# Patient Record
Sex: FEMALE | Race: WHITE | NOT HISPANIC OR LATINO | Employment: FULL TIME | URBAN - METROPOLITAN AREA
[De-identification: names, ages, dates, MRNs, and addresses within clinical notes are randomized per-mention and may not be internally consistent; named-entity substitution may affect disease eponyms.]

---

## 2023-12-01 ENCOUNTER — OFFICE VISIT (OUTPATIENT)
Dept: PLASTIC SURGERY | Facility: CLINIC | Age: 33
End: 2023-12-01
Payer: COMMERCIAL

## 2023-12-01 DIAGNOSIS — O35.AXX0 PREGNANCY COMPLICATED BY FETAL CLEFT LIP, SINGLE OR UNSPECIFIED FETUS: Primary | ICD-10-CM

## 2023-12-01 PROCEDURE — 99203 PR OFFICE/OUTPT VISIT, NEW, LEVL III, 30-44 MIN: ICD-10-PCS | Mod: S$GLB,,, | Performed by: PLASTIC SURGERY

## 2023-12-01 PROCEDURE — 99203 OFFICE O/P NEW LOW 30 MIN: CPT | Mod: S$GLB,,, | Performed by: PLASTIC SURGERY

## 2023-12-01 NOTE — PROGRESS NOTES
During the prenatal visit, I reviewed with Omaira and her  the general overview of cleft lip and palate care. Prior to her delivery, I have asked her to tour the hospital she plans to delivery her son and meet with the feeding specialists and the pediatricians who will take care of her baby after delivery. While touring the hospital, it would be good for Omaira to see the various bottles available to feed her son after he is born. I also suggested bringing one or two Dr. Andersen specialty feeders with her to the hospital at the time of delivery.     The timing of the lip repair is typically around 3-to-4 months of age depending on her son's overall health and weight gain. The priority for any  cleft patient is feeding and weight gain. There are a number of feeding systems/bottles available and I would recommend the [employer]'s webpage to view the resources available to her.     It is unclear at this time if her son has a cleft palate. It is sometimes hard to see on ultrasound images.     Middle ear dysfunction is common in children with a cleft palate, and should her son have a cleft palate, our ENT surgeons will evaluate for middle ear dysfunction and the possible need for ear tubes. If tubes are needed, they will be placed at the time of the lip repair.     If her son is found to have a cleft palate, the palate surgery is performed around age 1.     We have a comprehensive Cleft Lip and Palate Team at the [facility]. As part of that team we have a craniofacial pediatrician, pediatric ENT, speech and language pathologists, a pediatric dentist, pediatric orthodontists, a , and a . Additionally, we have the full range of pediatric subspecialists should Omaira's son require additional care.     I asked Omaira to contact my office shortly after her son is born and to send photos. I would like to see him within 2 weeks after delivery.      40 minutes of time, of which greater than fifty percent of the total visit was counseling/coordinating care as documented above, was spent with the patient for this outpatient consult (91106).

## 2024-02-14 ENCOUNTER — PATIENT MESSAGE (OUTPATIENT)
Dept: PLASTIC SURGERY | Facility: CLINIC | Age: 34
End: 2024-02-14
Payer: COMMERCIAL

## 2024-02-22 ENCOUNTER — PATIENT MESSAGE (OUTPATIENT)
Dept: PLASTIC SURGERY | Facility: CLINIC | Age: 34
End: 2024-02-22
Payer: COMMERCIAL

## 2024-03-27 ENCOUNTER — PATIENT MESSAGE (OUTPATIENT)
Dept: PLASTIC SURGERY | Facility: CLINIC | Age: 34
End: 2024-03-27
Payer: COMMERCIAL